# Patient Record
Sex: FEMALE | URBAN - METROPOLITAN AREA
[De-identification: names, ages, dates, MRNs, and addresses within clinical notes are randomized per-mention and may not be internally consistent; named-entity substitution may affect disease eponyms.]

---

## 2023-03-06 ENCOUNTER — LAB REQUISITION (OUTPATIENT)
Dept: LAB | Facility: CLINIC | Age: 23
End: 2023-03-06

## 2023-03-06 DIAGNOSIS — Z12.4 ENCOUNTER FOR SCREENING FOR MALIGNANT NEOPLASM OF CERVIX: ICD-10-CM

## 2023-03-06 PROCEDURE — 87624 HPV HI-RISK TYP POOLED RSLT: CPT | Performed by: NURSE PRACTITIONER

## 2023-03-06 PROCEDURE — G0124 SCREEN C/V THIN LAYER BY MD: HCPCS | Performed by: PATHOLOGY

## 2023-03-06 PROCEDURE — G0145 SCR C/V CYTO,THINLAYER,RESCR: HCPCS | Performed by: NURSE PRACTITIONER

## 2023-03-12 LAB
BKR LAB AP GYN ADEQUACY: ABNORMAL
BKR LAB AP GYN INTERPRETATION: ABNORMAL
BKR LAB AP HPV REFLEX: ABNORMAL
BKR LAB AP LMP: ABNORMAL
BKR LAB AP PREVIOUS ABNL DX: ABNORMAL
BKR LAB AP PREVIOUS ABNORMAL: ABNORMAL
PATH REPORT.COMMENTS IMP SPEC: ABNORMAL
PATH REPORT.COMMENTS IMP SPEC: ABNORMAL
PATH REPORT.RELEVANT HX SPEC: ABNORMAL

## 2023-03-14 LAB
HUMAN PAPILLOMA VIRUS 16 DNA: NEGATIVE
HUMAN PAPILLOMA VIRUS 18 DNA: NEGATIVE
HUMAN PAPILLOMA VIRUS FINAL DIAGNOSIS: NORMAL
HUMAN PAPILLOMA VIRUS OTHER HR: NEGATIVE

## 2023-09-11 ENCOUNTER — LAB REQUISITION (OUTPATIENT)
Dept: LAB | Facility: CLINIC | Age: 23
End: 2023-09-11

## 2023-09-11 DIAGNOSIS — Z00.00 ENCOUNTER FOR GENERAL ADULT MEDICAL EXAMINATION WITHOUT ABNORMAL FINDINGS: ICD-10-CM

## 2023-09-11 DIAGNOSIS — E55.9 VITAMIN D DEFICIENCY, UNSPECIFIED: ICD-10-CM

## 2023-09-11 LAB
ERYTHROCYTE [DISTWIDTH] IN BLOOD BY AUTOMATED COUNT: 12.3 % (ref 10–15)
HCT VFR BLD AUTO: 38.7 % (ref 35–47)
HGB BLD-MCNC: 13.2 G/DL (ref 11.7–15.7)
MCH RBC QN AUTO: 30.9 PG (ref 26.5–33)
MCHC RBC AUTO-ENTMCNC: 34.1 G/DL (ref 31.5–36.5)
MCV RBC AUTO: 91 FL (ref 78–100)
PLATELET # BLD AUTO: 182 10E3/UL (ref 150–450)
RBC # BLD AUTO: 4.27 10E6/UL (ref 3.8–5.2)
WBC # BLD AUTO: 7.6 10E3/UL (ref 4–11)

## 2023-09-11 PROCEDURE — 80061 LIPID PANEL: CPT | Performed by: PEDIATRICS

## 2023-09-11 PROCEDURE — 85027 COMPLETE CBC AUTOMATED: CPT | Performed by: PEDIATRICS

## 2023-09-11 PROCEDURE — 82306 VITAMIN D 25 HYDROXY: CPT | Performed by: PEDIATRICS

## 2023-09-12 LAB
CHOLEST SERPL-MCNC: 155 MG/DL
DEPRECATED CALCIDIOL+CALCIFEROL SERPL-MC: 39 UG/L (ref 20–75)
HDLC SERPL-MCNC: 42 MG/DL
LDLC SERPL CALC-MCNC: 89 MG/DL
NONHDLC SERPL-MCNC: 113 MG/DL
TRIGL SERPL-MCNC: 121 MG/DL

## 2025-04-01 ENCOUNTER — HOSPITAL ENCOUNTER (EMERGENCY)
Facility: CLINIC | Age: 25
Discharge: HOME | End: 2025-04-01
Attending: EMERGENCY MEDICINE | Admitting: EMERGENCY MEDICINE
Payer: COMMERCIAL

## 2025-04-01 ENCOUNTER — APPOINTMENT (OUTPATIENT)
Dept: GENERAL RADIOLOGY | Facility: CLINIC | Age: 25
End: 2025-04-01
Attending: EMERGENCY MEDICINE
Payer: COMMERCIAL

## 2025-04-01 VITALS
TEMPERATURE: 98.6 F | WEIGHT: 170 LBS | RESPIRATION RATE: 16 BRPM | HEIGHT: 63 IN | OXYGEN SATURATION: 100 % | SYSTOLIC BLOOD PRESSURE: 112 MMHG | DIASTOLIC BLOOD PRESSURE: 80 MMHG | HEART RATE: 91 BPM | BODY MASS INDEX: 30.12 KG/M2

## 2025-04-01 DIAGNOSIS — Z77.098 EXPOSURE TO CHEMICAL INHALATION: ICD-10-CM

## 2025-04-01 LAB
ALBUMIN SERPL BCG-MCNC: 4.7 G/DL (ref 3.5–5.2)
ALP SERPL-CCNC: 93 U/L (ref 40–150)
ALT SERPL W P-5'-P-CCNC: 14 U/L (ref 0–50)
ANION GAP SERPL CALCULATED.3IONS-SCNC: 11 MMOL/L (ref 7–15)
AST SERPL W P-5'-P-CCNC: 20 U/L (ref 0–45)
BASE EXCESS BLDV CALC-SCNC: 1.4 MMOL/L (ref -3–3)
BASOPHILS # BLD AUTO: 0 10E3/UL (ref 0–0.2)
BASOPHILS NFR BLD AUTO: 0 %
BILIRUB SERPL-MCNC: 0.3 MG/DL
BUN SERPL-MCNC: 11.9 MG/DL (ref 6–20)
CALCIUM SERPL-MCNC: 9.5 MG/DL (ref 8.8–10.4)
CHLORIDE SERPL-SCNC: 102 MMOL/L (ref 98–107)
CREAT SERPL-MCNC: 0.75 MG/DL (ref 0.51–0.95)
EGFRCR SERPLBLD CKD-EPI 2021: >90 ML/MIN/1.73M2
EOSINOPHIL # BLD AUTO: 0.2 10E3/UL (ref 0–0.7)
EOSINOPHIL NFR BLD AUTO: 2 %
ERYTHROCYTE [DISTWIDTH] IN BLOOD BY AUTOMATED COUNT: 11.8 % (ref 10–15)
GLUCOSE SERPL-MCNC: 89 MG/DL (ref 70–99)
HCG INTACT+B SERPL-ACNC: <1 MIU/ML
HCO3 BLDV-SCNC: 28 MMOL/L (ref 21–28)
HCO3 SERPL-SCNC: 26 MMOL/L (ref 22–29)
HCT VFR BLD AUTO: 38.8 % (ref 35–47)
HGB BLD-MCNC: 13.7 G/DL (ref 11.7–15.7)
IMM GRANULOCYTES # BLD: 0 10E3/UL
IMM GRANULOCYTES NFR BLD: 0 %
LYMPHOCYTES # BLD AUTO: 1.7 10E3/UL (ref 0.8–5.3)
LYMPHOCYTES NFR BLD AUTO: 20 %
MCH RBC QN AUTO: 32 PG (ref 26.5–33)
MCHC RBC AUTO-ENTMCNC: 35.3 G/DL (ref 31.5–36.5)
MCV RBC AUTO: 91 FL (ref 78–100)
MONOCYTES # BLD AUTO: 0.5 10E3/UL (ref 0–1.3)
MONOCYTES NFR BLD AUTO: 6 %
NEUTROPHILS # BLD AUTO: 6.1 10E3/UL (ref 1.6–8.3)
NEUTROPHILS NFR BLD AUTO: 72 %
NRBC # BLD AUTO: 0 10E3/UL
NRBC BLD AUTO-RTO: 0 /100
O2/TOTAL GAS SETTING VFR VENT: 21 %
OXYHGB MFR BLDV: 23 % (ref 70–75)
PCO2 BLDV: 51 MM HG (ref 40–50)
PH BLDV: 7.35 [PH] (ref 7.32–7.43)
PLATELET # BLD AUTO: 235 10E3/UL (ref 150–450)
PO2 BLDV: 19 MM HG (ref 25–47)
POTASSIUM SERPL-SCNC: 4.2 MMOL/L (ref 3.4–5.3)
PROT SERPL-MCNC: 7.5 G/DL (ref 6.4–8.3)
RBC # BLD AUTO: 4.28 10E6/UL (ref 3.8–5.2)
SAO2 % BLDV: 23.3 % (ref 70–75)
SODIUM SERPL-SCNC: 139 MMOL/L (ref 135–145)
WBC # BLD AUTO: 8.5 10E3/UL (ref 4–11)

## 2025-04-01 PROCEDURE — 99285 EMERGENCY DEPT VISIT HI MDM: CPT | Mod: 25 | Performed by: EMERGENCY MEDICINE

## 2025-04-01 PROCEDURE — 99284 EMERGENCY DEPT VISIT MOD MDM: CPT | Performed by: EMERGENCY MEDICINE

## 2025-04-01 PROCEDURE — 85014 HEMATOCRIT: CPT | Performed by: EMERGENCY MEDICINE

## 2025-04-01 PROCEDURE — 84702 CHORIONIC GONADOTROPIN TEST: CPT | Performed by: EMERGENCY MEDICINE

## 2025-04-01 PROCEDURE — 85025 COMPLETE CBC W/AUTO DIFF WBC: CPT | Performed by: EMERGENCY MEDICINE

## 2025-04-01 PROCEDURE — 71046 X-RAY EXAM CHEST 2 VIEWS: CPT

## 2025-04-01 PROCEDURE — 93010 ELECTROCARDIOGRAM REPORT: CPT | Performed by: EMERGENCY MEDICINE

## 2025-04-01 PROCEDURE — 93005 ELECTROCARDIOGRAM TRACING: CPT | Performed by: EMERGENCY MEDICINE

## 2025-04-01 PROCEDURE — 84155 ASSAY OF PROTEIN SERUM: CPT | Performed by: EMERGENCY MEDICINE

## 2025-04-01 PROCEDURE — 96374 THER/PROPH/DIAG INJ IV PUSH: CPT | Performed by: EMERGENCY MEDICINE

## 2025-04-01 PROCEDURE — 82805 BLOOD GASES W/O2 SATURATION: CPT | Performed by: EMERGENCY MEDICINE

## 2025-04-01 PROCEDURE — 250N000011 HC RX IP 250 OP 636: Mod: JZ | Performed by: EMERGENCY MEDICINE

## 2025-04-01 PROCEDURE — 36415 COLL VENOUS BLD VENIPUNCTURE: CPT | Performed by: EMERGENCY MEDICINE

## 2025-04-01 PROCEDURE — 80051 ELECTROLYTE PANEL: CPT | Performed by: EMERGENCY MEDICINE

## 2025-04-01 RX ORDER — KETOROLAC TROMETHAMINE 30 MG/ML
30 INJECTION, SOLUTION INTRAMUSCULAR; INTRAVENOUS ONCE
Status: COMPLETED | OUTPATIENT
Start: 2025-04-01 | End: 2025-04-01

## 2025-04-01 RX ORDER — VALACYCLOVIR HYDROCHLORIDE 500 MG/1
1 TABLET, FILM COATED ORAL DAILY
COMMUNITY
Start: 2024-12-29

## 2025-04-01 RX ORDER — CETIRIZINE HYDROCHLORIDE 10 MG/1
10 TABLET ORAL
COMMUNITY

## 2025-04-01 RX ORDER — FLUOXETINE 20 MG/1
30 TABLET, FILM COATED ORAL DAILY
COMMUNITY

## 2025-04-01 RX ADMIN — KETOROLAC TROMETHAMINE 30 MG: 30 INJECTION, SOLUTION INTRAMUSCULAR at 16:34

## 2025-04-01 ASSESSMENT — ACTIVITIES OF DAILY LIVING (ADL)
ADLS_ACUITY_SCORE: 41
ADLS_ACUITY_SCORE: 41

## 2025-04-01 ASSESSMENT — COLUMBIA-SUICIDE SEVERITY RATING SCALE - C-SSRS
6. HAVE YOU EVER DONE ANYTHING, STARTED TO DO ANYTHING, OR PREPARED TO DO ANYTHING TO END YOUR LIFE?: NO
2. HAVE YOU ACTUALLY HAD ANY THOUGHTS OF KILLING YOURSELF IN THE PAST MONTH?: NO
1. IN THE PAST MONTH, HAVE YOU WISHED YOU WERE DEAD OR WISHED YOU COULD GO TO SLEEP AND NOT WAKE UP?: NO

## 2025-04-01 NOTE — ED PROVIDER NOTES
"ED Provider Note  [facility]      History     Chief Complaint   Patient presents with    Toxic Inhalation     Patient was exposed to anhydrous aluminum chloride (powder) in chemistry lab today. Experiencing nausea, burning in lungs, dizziness, and shortness of breath       HPI  Liseth Moss is a 24 year old female who presents to the ED with concerns of nausea/vomiting, dizziness, and shortness of breath after toxic chemical inhalation. 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 She opened a bottle of anhydrous aluminum chloride and a waft of the powder was inhaled. She reports a bad taste in her mouth but is not aware of inhaling or swallowing \"any chunks.\" She reports burning and tightness in her chest, which affects ability to get a deep breath in. She does not feel throat is closing or that she is unable to get a breath in. No change to vision, palpitations, tingling, or continued nausea. Did vomit once shortly after exposure. Joined by her boyfriend, Frankie.    Spoke to Poison Control at 14:40: Not worried given her symptoms, only concerned for delayed pulm edema if underlying asthma or breathing condition, can skip CXR unless fluid is heard on exam.     Per [facility] website: delayed pulmonary edema can occur up to 48-72 hours    Past Medical History  No past medical history on file.  No past surgical history on file.  cetirizine (ZYRTEC) 10 MG tablet  FLUoxetine 20 MG tablet  valACYclovir (VALTREX) 500 MG tablet      Allergies   Allergen Reactions    Pineapple      Family History  No family history on file.  Social History   Social History     Tobacco Use    Smoking status: Never    Smokeless tobacco: Never   Substance Use Topics    Alcohol use: Not Currently    Drug use: Never      A medically appropriate review of systems was performed with pertinent positives and negatives noted in the HPI, and all other systems negative.    Physical Exam   BP: (!) " "148/93  Pulse: 80  Temp: 98.8  F (37.1  C)  Resp: 16  Height: 160 cm (5' 3\")  Weight: 77.1 kg (170 lb)  SpO2: 100 %  Physical Exam  Constitutional:       General: She is not in acute distress.     Appearance: Normal appearance. She is not diaphoretic.   HENT:      Nose: Nose normal.      Mouth/Throat:      Mouth: Mucous membranes are moist.      Pharynx: Posterior oropharyngeal erythema present. No oropharyngeal exudate.   Eyes:      Extraocular Movements: Extraocular movements intact.      Pupils: Pupils are equal, round, and reactive to light.   Cardiovascular:      Rate and Rhythm: Normal rate and regular rhythm.      Heart sounds: Normal heart sounds.   Pulmonary:      Effort: Pulmonary effort is normal. No respiratory distress.      Breath sounds: Normal breath sounds. No stridor. No wheezing or rales.   Abdominal:      General: Abdomen is flat.      Palpations: Abdomen is soft.   Musculoskeletal:         General: Normal range of motion.      Cervical back: Normal range of motion.      Right lower leg: No edema.      Left lower leg: No edema.   Neurological:      General: No focal deficit present.      Mental Status: She is alert and oriented to person, place, and time.      Gait: Gait normal.   Psychiatric:         Mood and Affect: Mood normal.         Behavior: Behavior normal.       ED Course, Procedures, & Data      Procedures            EKG Interpretation:      Interpreted by JOHN CHATMAN MD, MD  Time reviewed:1532   Symptoms at time of EKG: chest pain, inhalation injury  Rhythm: normal sinus   Rate: 80  Axis: NORMAL  Ectopy: none  Conduction: normal  ST Segments/ T Waves: No ST-T wave changes  Q Waves: none  Comparison to prior: No old EKG available    Clinical Impression: normal EKG      Results for orders placed or performed during the hospital encounter of 04/01/25   XR Chest 2 Views     Status: None    Narrative    EXAM: XR CHEST 2 VIEWS  LOCATION: [facility]" CENTER  DATE: 4/1/2025    INDICATION: chest pain  COMPARISON: None.      Impression    IMPRESSION:     Normal heart and mediastinal interfaces. Normal lung vascularity.    Moderate airway wall thickening around the shane consistent with bronchitis. No peripheral interstitial or alveolar opacities. Diaphragm curvature is preserved. No pleural effusion.    Thoracic vertebra are maintained in height and shape.   Comprehensive metabolic panel     Status: Normal   Result Value Ref Range    Sodium 139 135 - 145 mmol/L    Potassium 4.2 3.4 - 5.3 mmol/L    Carbon Dioxide (CO2) 26 22 - 29 mmol/L    Anion Gap 11 7 - 15 mmol/L    Urea Nitrogen 11.9 6.0 - 20.0 mg/dL    Creatinine 0.75 0.51 - 0.95 mg/dL    GFR Estimate >90 >60 mL/min/1.73m2    Calcium 9.5 8.8 - 10.4 mg/dL    Chloride 102 98 - 107 mmol/L    Glucose 89 70 - 99 mg/dL    Alkaline Phosphatase 93 40 - 150 U/L    AST 20 0 - 45 U/L    ALT 14 0 - 50 U/L    Protein Total 7.5 6.4 - 8.3 g/dL    Albumin 4.7 3.5 - 5.2 g/dL    Bilirubin Total 0.3 <=1.2 mg/dL   Blood gas venous     Status: Abnormal   Result Value Ref Range    pH Venous 7.35 7.32 - 7.43    pCO2 Venous 51 (H) 40 - 50 mm Hg    pO2 Venous 19 (L) 25 - 47 mm Hg    Bicarbonate Venous 28 21 - 28 mmol/L    Base Excess/Deficit Venous 1.4 -3.0 - 3.0 mmol/L    FIO2 21     Oxyhemoglobin Venous 23 (L) 70 - 75 %    O2 Sat, Venous 23.3 (L) 70.0 - 75.0 %    Narrative    In healthy individuals, oxyhemoglobin (O2Hb) and oxygen saturation (SO2) are approximately equal. In the presence of dyshemoglobins, oxyhemoglobin can be considerably lower than oxygen saturation.   HCG quantitative pregnancy     Status: Normal   Result Value Ref Range    hCG Quantitative <1 <5 mIU/mL   CBC with platelets and differential     Status: None   Result Value Ref Range    WBC Count 8.5 4.0 - 11.0 10e3/uL    RBC Count 4.28 3.80 - 5.20 10e6/uL    Hemoglobin 13.7 11.7 - 15.7 g/dL    Hematocrit 38.8 35.0 - 47.0 %    MCV 91 78 - 100 fL    MCH 32.0 26.5 -  33.0 pg    MCHC 35.3 31.5 - 36.5 g/dL    RDW 11.8 10.0 - 15.0 %    Platelet Count 235 150 - 450 10e3/uL    % Neutrophils 72 %    % Lymphocytes 20 %    % Monocytes 6 %    % Eosinophils 2 %    % Basophils 0 %    % Immature Granulocytes 0 %    NRBCs per 100 WBC 0 <1 /100    Absolute Neutrophils 6.1 1.6 - 8.3 10e3/uL    Absolute Lymphocytes 1.7 0.8 - 5.3 10e3/uL    Absolute Monocytes 0.5 0.0 - 1.3 10e3/uL    Absolute Eosinophils 0.2 0.0 - 0.7 10e3/uL    Absolute Basophils 0.0 0.0 - 0.2 10e3/uL    Absolute Immature Granulocytes 0.0 <=0.4 10e3/uL    Absolute NRBCs 0.0 10e3/uL   EKG 12-lead, tracing only     Status: None (Preliminary result)   Result Value Ref Range    Systolic Blood Pressure  mmHg    Diastolic Blood Pressure  mmHg    Ventricular Rate 80 BPM    Atrial Rate 80 BPM    WV Interval 138 ms    QRS Duration 80 ms     ms    QTc 435 ms    P Axis 35 degrees    R AXIS 17 degrees    T Axis 7 degrees    Interpretation ECG Sinus rhythm  Normal ECG      CBC with platelets differential     Status: None    Narrative    The following orders were created for panel order CBC with platelets differential.  Procedure                               Abnormality         Status                     ---------                               -----------         ------                     CBC with platelets and ...[[id number]]                      Final result                 Please view results for these tests on the individual orders.     Medications   ketorolac (TORADOL) injection 30 mg (30 mg Intravenous $Given 4/1/25 6208)     Labs Ordered and Resulted from Time of ED Arrival to Time of ED Departure   BLOOD GAS VENOUS - Abnormal       Result Value    pH Venous 7.35      pCO2 Venous 51 (*)     pO2 Venous 19 (*)     Bicarbonate Venous 28      Base Excess/Deficit Venous 1.4      FIO2 21      Oxyhemoglobin Venous 23 (*)     O2 Sat, Venous 23.3 (*)    COMPREHENSIVE METABOLIC PANEL - Normal    Sodium 139      Potassium 4.2      Carbon  Dioxide (CO2) 26      Anion Gap 11      Urea Nitrogen 11.9      Creatinine 0.75      GFR Estimate >90      Calcium 9.5      Chloride 102      Glucose 89      Alkaline Phosphatase 93      AST 20      ALT 14      Protein Total 7.5      Albumin 4.7      Bilirubin Total 0.3     HCG QUANTITATIVE PREGNANCY - Normal    hCG Quantitative <1     CBC WITH PLATELETS AND DIFFERENTIAL    WBC Count 8.5      RBC Count 4.28      Hemoglobin 13.7      Hematocrit 38.8      MCV 91      MCH 32.0      MCHC 35.3      RDW 11.8      Platelet Count 235      % Neutrophils 72      % Lymphocytes 20      % Monocytes 6      % Eosinophils 2      % Basophils 0      % Immature Granulocytes 0      NRBCs per 100 WBC 0      Absolute Neutrophils 6.1      Absolute Lymphocytes 1.7      Absolute Monocytes 0.5      Absolute Eosinophils 0.2      Absolute Basophils 0.0      Absolute Immature Granulocytes 0.0      Absolute NRBCs 0.0       XR Chest 2 Views   Final Result   IMPRESSION:       Normal heart and mediastinal interfaces. Normal lung vascularity.      Moderate airway wall thickening around the shane consistent with bronchitis. No peripheral interstitial or alveolar opacities. Diaphragm curvature is preserved. No pleural effusion.      Thoracic vertebra are maintained in height and shape.           Critical care was not performed.     Medical Decision Making  The patient's presentation was of moderate complexity (an acute complicated injury).    The patient's evaluation involved:  ordering and/or review of 3+ test(s) in this encounter (see separate area of note for details)  independent interpretation of testing performed by another health professional (EKG interpreted me with above results, chest radiograph interpreted by me-no pulmonary edema-confirmed by radiology)  discussion of management or test interpretation with another health professional (poison control)    The patient's management necessitated moderate risk (prescription drug management  including medications given in the ED).    Assessment & Plan    Patient is presenting following an acute inhalation of anhydrous aluminum chloride in a university laboratory. She is experiencing burning in the lungs, difficulty getting a full breath in, dizziness, and one episode of vomiting. On arrival, her airway was patent with 100% spO2 and RR of 16. She was anxious but not in acute distress. Her BP was elevated to 148/93. Her lungs were clear in all fields, no wheezing or rales. She was no longer nausea or dizziness but did have a mild headache. Poison control was consulted and advised low risk of pulmonary edema with no other significant side effects expected. In the ED, she received IV toradol for pain. Given her chest tightness/burning, an EKG was ordered showing sinus rhythm and normal rate. CBC, CMP, and glucose were unremarkable, negative for pregnancy. VBG had pH of 7.35 and pCO2 of 51. CXR was overall normal, however Moderate airway wall thickening around the shane. The patient is not experiencing significant SOB or concerning signs on physical exam. Patient was discharged to home with instructions to return with any new or worsening symptoms.    --    ED Attending Physician Attestation    I JOHN CHATMAN MD, MD, cared for this patient with the Medical Student. I performed, or re-performed, the physical exam and medical decision-making. I have verified the accuracy of all the medical student findings and documentation above, and have edited as necessary.    Summary of HPI, PE, ED Course   Patient is a 24 year old female evaluated in the emergency department for evaluation after inhaling anhydrous aluminum chloride and a University laboratory. Exam and ED course notable for normal vital signs.  Heart regular rate rhythm without murmur.  Lungs good auscultation bilaterally.  Normal gross neurologic exam.  EKG normal and without acute ischemia.  Chest radiograph without pulmonary edema.  Labs essentially  unremarkable.  Patient given Toradol.  Feeling improved.  Discussed with poison control-low likelihood of any complication but rare recurrence of pulmonary edema possible.  Patient has no respiratory distress and has clear lungs on exam.  No pulmonary edema on chest radiograph.  Her sats are normal.  She appears appropriate for outpatient management.  Strict return precautions provided.. After the completion of care in the emergency department, the patient was discharged.      DUC CLEANING MD, MD  Emergency Medicine        I have reviewed the nursing notes. I have reviewed the findings, diagnosis, plan and need for follow up with the patient.    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  Josefa Bennett, MS4    Duc Cleaning Md  [facility] EMERGENCY DEPARTMENT  4/1/2025     Duc Cleaning MD  04/02/25 1534

## 2025-04-01 NOTE — DISCHARGE INSTRUCTIONS
Take ibuprofen 600 mg every 6 hours with food as needed for pain.  You may also take doses of acetaminophen in between doses of ibuprofen.  Drink plenty of fluids.    Return to the Emergency Department as severe difficulty breathing, fever, cough, or other concerns.

## 2025-04-01 NOTE — ED TRIAGE NOTES
"Patient inhaled toxic chemical in chemistry lab today. Experiencing burning in lungs, \"heartburn\", nausea and vomiting, dizziness.      Triage Assessment (Adult)       Row Name 04/01/25 1759          Triage Assessment    Airway WDL WDL        Respiratory WDL    Respiratory WDL WDL        Cardiac WDL    Cardiac WDL WDL        Peripheral/Neurovascular WDL    Peripheral Neurovascular WDL WDL        Cognitive/Neuro/Behavioral WDL    Cognitive/Neuro/Behavioral WDL WDL                     "

## 2025-04-02 LAB
ATRIAL RATE - MUSE: 80 BPM
DIASTOLIC BLOOD PRESSURE - MUSE: NORMAL MMHG
INTERPRETATION ECG - MUSE: NORMAL
P AXIS - MUSE: 35 DEGREES
PR INTERVAL - MUSE: 138 MS
QRS DURATION - MUSE: 80 MS
QT - MUSE: 378 MS
QTC - MUSE: 435 MS
R AXIS - MUSE: 17 DEGREES
SYSTOLIC BLOOD PRESSURE - MUSE: NORMAL MMHG
T AXIS - MUSE: 7 DEGREES
VENTRICULAR RATE- MUSE: 80 BPM

## 2025-05-27 ENCOUNTER — APPOINTMENT (OUTPATIENT)
Dept: CT IMAGING | Facility: CLINIC | Age: 25
End: 2025-05-27
Attending: EMERGENCY MEDICINE
Payer: COMMERCIAL

## 2025-05-27 ENCOUNTER — HOSPITAL ENCOUNTER (EMERGENCY)
Facility: CLINIC | Age: 25
Discharge: HOME | End: 2025-05-27
Attending: EMERGENCY MEDICINE | Admitting: EMERGENCY MEDICINE
Payer: COMMERCIAL

## 2025-05-27 VITALS
BODY MASS INDEX: 29.53 KG/M2 | OXYGEN SATURATION: 100 % | SYSTOLIC BLOOD PRESSURE: 113 MMHG | RESPIRATION RATE: 16 BRPM | WEIGHT: 166.7 LBS | TEMPERATURE: 98.2 F | DIASTOLIC BLOOD PRESSURE: 89 MMHG | HEART RATE: 84 BPM

## 2025-05-27 DIAGNOSIS — R09.1 PLEURITIS: ICD-10-CM

## 2025-05-27 DIAGNOSIS — R91.1 PULMONARY NODULE: ICD-10-CM

## 2025-05-27 LAB
ANION GAP SERPL CALCULATED.3IONS-SCNC: 10 MMOL/L (ref 7–15)
BASOPHILS # BLD AUTO: 0 10E3/UL (ref 0–0.2)
BASOPHILS NFR BLD AUTO: 0 %
BUN SERPL-MCNC: 13.8 MG/DL (ref 6–20)
CALCIUM SERPL-MCNC: 9.1 MG/DL (ref 8.8–10.4)
CHLORIDE SERPL-SCNC: 103 MMOL/L (ref 98–107)
CREAT SERPL-MCNC: 0.81 MG/DL (ref 0.51–0.95)
EGFRCR SERPLBLD CKD-EPI 2021: >90 ML/MIN/1.73M2
EOSINOPHIL # BLD AUTO: 0.4 10E3/UL (ref 0–0.7)
EOSINOPHIL NFR BLD AUTO: 7 %
ERYTHROCYTE [DISTWIDTH] IN BLOOD BY AUTOMATED COUNT: 11.9 % (ref 10–15)
GLUCOSE SERPL-MCNC: 96 MG/DL (ref 70–99)
HCG SERPL QL: NEGATIVE
HCO3 SERPL-SCNC: 26 MMOL/L (ref 22–29)
HCT VFR BLD AUTO: 39.4 % (ref 35–47)
HGB BLD-MCNC: 13.9 G/DL (ref 11.7–15.7)
IMM GRANULOCYTES # BLD: 0 10E3/UL
IMM GRANULOCYTES NFR BLD: 0 %
LYMPHOCYTES # BLD AUTO: 1.5 10E3/UL (ref 0.8–5.3)
LYMPHOCYTES NFR BLD AUTO: 28 %
MCH RBC QN AUTO: 32.1 PG (ref 26.5–33)
MCHC RBC AUTO-ENTMCNC: 35.3 G/DL (ref 31.5–36.5)
MCV RBC AUTO: 91 FL (ref 78–100)
MONOCYTES # BLD AUTO: 0.4 10E3/UL (ref 0–1.3)
MONOCYTES NFR BLD AUTO: 8 %
NEUTROPHILS # BLD AUTO: 3 10E3/UL (ref 1.6–8.3)
NEUTROPHILS NFR BLD AUTO: 57 %
NRBC # BLD AUTO: 0 10E3/UL
NRBC BLD AUTO-RTO: 0 /100
PLATELET # BLD AUTO: 233 10E3/UL (ref 150–450)
POTASSIUM SERPL-SCNC: 4 MMOL/L (ref 3.4–5.3)
RBC # BLD AUTO: 4.33 10E6/UL (ref 3.8–5.2)
SODIUM SERPL-SCNC: 139 MMOL/L (ref 135–145)
WBC # BLD AUTO: 5.3 10E3/UL (ref 4–11)

## 2025-05-27 PROCEDURE — 250N000011 HC RX IP 250 OP 636: Performed by: EMERGENCY MEDICINE

## 2025-05-27 PROCEDURE — 71275 CT ANGIOGRAPHY CHEST: CPT

## 2025-05-27 PROCEDURE — 99284 EMERGENCY DEPT VISIT MOD MDM: CPT | Performed by: EMERGENCY MEDICINE

## 2025-05-27 PROCEDURE — 250N000009 HC RX 250: Performed by: EMERGENCY MEDICINE

## 2025-05-27 PROCEDURE — 82565 ASSAY OF CREATININE: CPT | Performed by: EMERGENCY MEDICINE

## 2025-05-27 PROCEDURE — 84703 CHORIONIC GONADOTROPIN ASSAY: CPT | Performed by: EMERGENCY MEDICINE

## 2025-05-27 PROCEDURE — 85025 COMPLETE CBC W/AUTO DIFF WBC: CPT | Performed by: EMERGENCY MEDICINE

## 2025-05-27 PROCEDURE — 99285 EMERGENCY DEPT VISIT HI MDM: CPT | Mod: 25 | Performed by: EMERGENCY MEDICINE

## 2025-05-27 PROCEDURE — 36415 COLL VENOUS BLD VENIPUNCTURE: CPT | Performed by: EMERGENCY MEDICINE

## 2025-05-27 RX ORDER — OMEPRAZOLE 20 MG/1
20 CAPSULE, DELAYED RELEASE ORAL DAILY
COMMUNITY

## 2025-05-27 RX ORDER — IOPAMIDOL 755 MG/ML
100 INJECTION, SOLUTION INTRAVASCULAR ONCE
Status: COMPLETED | OUTPATIENT
Start: 2025-05-27 | End: 2025-05-27

## 2025-05-27 RX ORDER — ALBUTEROL SULFATE 90 UG/1
2 INHALANT RESPIRATORY (INHALATION)
COMMUNITY
Start: 2025-04-08

## 2025-05-27 RX ADMIN — SODIUM CHLORIDE 84 ML: 9 INJECTION, SOLUTION INTRAVENOUS at 09:55

## 2025-05-27 RX ADMIN — IOPAMIDOL 64 ML: 755 INJECTION, SOLUTION INTRAVENOUS at 09:55

## 2025-05-27 ASSESSMENT — COLUMBIA-SUICIDE SEVERITY RATING SCALE - C-SSRS
1. IN THE PAST MONTH, HAVE YOU WISHED YOU WERE DEAD OR WISHED YOU COULD GO TO SLEEP AND NOT WAKE UP?: NO
2. HAVE YOU ACTUALLY HAD ANY THOUGHTS OF KILLING YOURSELF IN THE PAST MONTH?: NO
6. HAVE YOU EVER DONE ANYTHING, STARTED TO DO ANYTHING, OR PREPARED TO DO ANYTHING TO END YOUR LIFE?: NO

## 2025-05-27 ASSESSMENT — ACTIVITIES OF DAILY LIVING (ADL)
ADLS_ACUITY_SCORE: 41

## 2025-05-27 ASSESSMENT — ENCOUNTER SYMPTOMS: SHORTNESS OF BREATH: 1

## 2025-05-27 NOTE — Clinical Note
Liseth Moss was seen and treated in our emergency department on 5/27/2025.  She may return to work on 05/28/2025.       If you have any questions or concerns, please don't hesitate to call.      Donell Zamarripa MD

## 2025-05-27 NOTE — ED TRIAGE NOTES
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 She inhaled it. She was seen in the ER then followed up with her PCP. At first her inhaler and acid blocker medications were working now they are not. Last 1-2 weeks symptoms worsening again. She has shortness of breath and heart burn. She is able to communicate in triage without signs of distress and shows good ventilation. She reports that she has a history of eosinophilic esophagitis too.      Triage Assessment (Adult)       Row Name 05/27/25 0839          Triage Assessment    Airway WDL WDL        Respiratory WDL    Respiratory WDL X;rhythm/pattern     Rhythm/Pattern, Respiratory shortness of breath;depth regular;pattern regular;unlabored        Skin Circulation/Temperature WDL    Skin Circulation/Temperature WDL WDL        Cardiac WDL    Cardiac WDL X  heartburn        Peripheral/Neurovascular WDL    Peripheral Neurovascular WDL WDL        Cognitive/Neuro/Behavioral WDL    Cognitive/Neuro/Behavioral WDL WDL

## 2025-05-27 NOTE — DISCHARGE INSTRUCTIONS
You had a reassuring evaluation in the emergency department today. Please follow up with your primary care physician for further routine evaluation. If you develop worsening symptoms or other new emergent concerns, then return to the emergency department for further evaluation.
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
## 2025-07-13 ENCOUNTER — HEALTH MAINTENANCE LETTER (OUTPATIENT)
Age: 25
End: 2025-07-13